# Patient Record
Sex: FEMALE | NOT HISPANIC OR LATINO | Employment: OTHER | URBAN - NONMETROPOLITAN AREA
[De-identification: names, ages, dates, MRNs, and addresses within clinical notes are randomized per-mention and may not be internally consistent; named-entity substitution may affect disease eponyms.]

---

## 2022-03-15 ENCOUNTER — HOSPITAL ENCOUNTER (OUTPATIENT)
Dept: CT IMAGING | Facility: HOSPITAL | Age: 37
Discharge: HOME | End: 2022-03-15

## 2022-03-15 ENCOUNTER — HOSPITAL ENCOUNTER (OUTPATIENT)
Dept: CT IMAGING | Facility: HOSPITAL | Age: 37
End: 2022-03-15

## 2022-03-15 ENCOUNTER — HOSPITAL ENCOUNTER (OUTPATIENT)
Dept: INTERVENTIONAL RADIOLOGY/VASCULAR | Facility: HOSPITAL | Age: 37
Discharge: HOME | End: 2022-03-15

## 2022-03-15 VITALS
SYSTOLIC BLOOD PRESSURE: 117 MMHG | HEART RATE: 75 BPM | DIASTOLIC BLOOD PRESSURE: 78 MMHG | OXYGEN SATURATION: 100 % | RESPIRATION RATE: 18 BRPM

## 2022-03-15 DIAGNOSIS — M22.2X1 PATELLOFEMORAL DISORDER OF RIGHT KNEE: ICD-10-CM

## 2022-03-15 DIAGNOSIS — M25.561 RIGHT KNEE PAIN, UNSPECIFIED CHRONICITY: ICD-10-CM

## 2022-03-15 PROCEDURE — 25010000002 IOPAMIDOL 61 % SOLUTION: Performed by: RADIOLOGY

## 2022-03-15 PROCEDURE — 0 LIDOCAINE 1 % SOLUTION: Performed by: RADIOLOGY

## 2022-03-15 PROCEDURE — 73702 CT LWR EXTREMITY W/O&W/DYE: CPT

## 2022-03-15 PROCEDURE — 77002 NEEDLE LOCALIZATION BY XRAY: CPT

## 2022-03-15 RX ORDER — LIDOCAINE HYDROCHLORIDE 10 MG/ML
INJECTION, SOLUTION INFILTRATION; PERINEURAL
Status: DISCONTINUED | OUTPATIENT
Start: 2022-03-15 | End: 2022-03-16 | Stop reason: HOSPADM

## 2022-03-15 RX ADMIN — IOPAMIDOL 20 ML: 612 INJECTION, SOLUTION INTRAVENOUS at 11:54

## 2022-03-15 RX ADMIN — LIDOCAINE HYDROCHLORIDE 10 ML: 10 INJECTION, SOLUTION INFILTRATION; PERINEURAL at 11:26

## 2022-03-15 NOTE — POST-PROCEDURE NOTE
INTERVENTIONAL RADIOLOGY: BRIEF OP NOTE    Pre-Procedure Diagnosis: right knee pain, patellofemoral arthroplasty  Post-Procedure Diagnosis: same  Procedure: Fluoroscopically guided right knee contrast injection  Anesthesia: local  Staff: Niyah Starkey M.D.  EBL: none  Specimens: none  Drains: none  Findings: Contrast layering in suprpatellar recess. 50% dilute Isovue 300 20mL injected plus 2mL lidocaine.  Complications: none    Niyah Starkey M.D.  Vascular, Interventional and Wound Physician  IR Chief, [facility]  Cell: [phone] / Office: [phone]

## 2022-03-15 NOTE — PRE-PROCEDURE NOTE
INTERVENTIONAL RADIOLOGY    38yo F s/p patellofemoral arthroplasty w/Dr Veliz of [place], [place] for severe right knee pain. She subsequently stepped in a hole and twisted the knee and has pain ever since. Dr. Veliz has ordered a three phase bone scan to evaluate for implant loosening vs infection. IR consulted for image guided right knee arthrographic injection with subsequent metal reduction protocol CT to evaluate the joint space structures.    On exam, very pleasant female no distress, multiple arthroscopic port sites well healed medially and laterally and midline scar.    The patient's history and physical exam were reviewed, and no changes were noted. The risks, benefits, alternatives, and indications of the procedure were discussed with the patient, and all questions were answered. Informed consent was obtained. Specifically, damage or infection of the implant were discussed as risks.    Thank you very much for the referral. Please do not hesitate to contact me if I may be of further assistance.    Niyah Starkey M.D.  Vascular, Interventional and Wound Physician  CARLOS ALBERTO Chief, [facility]  Cell: [phone] / Office: [phone]